# Patient Record
Sex: FEMALE | HISPANIC OR LATINO
[De-identification: names, ages, dates, MRNs, and addresses within clinical notes are randomized per-mention and may not be internally consistent; named-entity substitution may affect disease eponyms.]

---

## 2022-08-01 PROBLEM — Z00.00 ENCOUNTER FOR PREVENTIVE HEALTH EXAMINATION: Status: ACTIVE | Noted: 2022-08-01

## 2022-08-02 ENCOUNTER — APPOINTMENT (OUTPATIENT)
Dept: MATERNAL FETAL MEDICINE | Facility: CLINIC | Age: 37
End: 2022-08-02

## 2022-08-02 ENCOUNTER — ASOB RESULT (OUTPATIENT)
Age: 37
End: 2022-08-02

## 2022-08-02 VITALS — WEIGHT: 194 LBS | BODY MASS INDEX: 28.73 KG/M2 | HEIGHT: 69 IN

## 2022-08-02 DIAGNOSIS — Z82.49 FAMILY HISTORY OF ISCHEMIC HEART DISEASE AND OTHER DISEASES OF THE CIRCULATORY SYSTEM: ICD-10-CM

## 2022-08-02 DIAGNOSIS — Z78.9 OTHER SPECIFIED HEALTH STATUS: ICD-10-CM

## 2022-08-02 DIAGNOSIS — Z98.891 HISTORY OF UTERINE SCAR FROM PREVIOUS SURGERY: ICD-10-CM

## 2022-08-02 PROCEDURE — G0108 DIAB MANAGE TRN  PER INDIV: CPT | Mod: 95

## 2022-08-02 RX ORDER — BLOOD SUGAR DIAGNOSTIC
STRIP MISCELLANEOUS 4 TIMES DAILY
Qty: 1 | Refills: 2 | Status: ACTIVE | COMMUNITY
Start: 2022-08-02 | End: 1900-01-01

## 2022-08-02 RX ORDER — BLOOD-GLUCOSE METER
W/DEVICE EACH MISCELLANEOUS
Qty: 1 | Refills: 0 | Status: ACTIVE | COMMUNITY
Start: 2022-08-02 | End: 1900-01-01

## 2022-08-02 RX ORDER — LANCETS 33 GAUGE
EACH MISCELLANEOUS
Qty: 1 | Refills: 2 | Status: ACTIVE | COMMUNITY
Start: 2022-08-02 | End: 1900-01-01

## 2022-08-09 ENCOUNTER — ASOB RESULT (OUTPATIENT)
Age: 37
End: 2022-08-09

## 2022-08-09 ENCOUNTER — APPOINTMENT (OUTPATIENT)
Dept: MATERNAL FETAL MEDICINE | Facility: CLINIC | Age: 37
End: 2022-08-09

## 2022-08-09 VITALS — BODY MASS INDEX: 29.03 KG/M2 | WEIGHT: 196 LBS | HEIGHT: 69 IN

## 2022-08-09 PROCEDURE — G0108 DIAB MANAGE TRN  PER INDIV: CPT | Mod: 95

## 2022-08-16 ENCOUNTER — APPOINTMENT (OUTPATIENT)
Dept: ANTEPARTUM | Facility: CLINIC | Age: 37
End: 2022-08-16

## 2022-08-16 ENCOUNTER — ASOB RESULT (OUTPATIENT)
Age: 37
End: 2022-08-16

## 2022-08-16 ENCOUNTER — APPOINTMENT (OUTPATIENT)
Dept: MATERNAL FETAL MEDICINE | Facility: CLINIC | Age: 37
End: 2022-08-16

## 2022-08-16 VITALS
SYSTOLIC BLOOD PRESSURE: 96 MMHG | RESPIRATION RATE: 16 BRPM | WEIGHT: 202 LBS | BODY MASS INDEX: 29.92 KG/M2 | HEART RATE: 74 BPM | OXYGEN SATURATION: 100 % | DIASTOLIC BLOOD PRESSURE: 60 MMHG | HEIGHT: 69 IN

## 2022-08-16 DIAGNOSIS — O34.219 MATERNAL CARE FOR UNSPECIFIED TYPE SCAR FROM PREVIOUS CESAREAN DELIVERY: ICD-10-CM

## 2022-08-16 DIAGNOSIS — O24.419 GESTATIONAL DIABETES MELLITUS IN PREGNANCY, UNSPECIFIED CONTROL: ICD-10-CM

## 2022-08-16 DIAGNOSIS — O09.529 SUPERVISION OF ELDERLY MULTIGRAVIDA, UNSPECIFIED TRIMESTER: ICD-10-CM

## 2022-08-16 PROCEDURE — 76816 OB US FOLLOW-UP PER FETUS: CPT

## 2022-08-16 PROCEDURE — 99204 OFFICE O/P NEW MOD 45 MIN: CPT

## 2022-08-16 PROCEDURE — 76819 FETAL BIOPHYS PROFIL W/O NST: CPT

## 2022-08-16 RX ORDER — MULTI-VITAMIN/MINERAL SUPPLEMENT WITH SODIUM ASCORBATE, CHOLECALCIFEROL, DI-ALPHA-TOCOPHERYL ACETATE, THIAMINE MONONITRATE, RIBOFLAVIN, NIACINAMIDE, PYRIDOXINE HCL, FOLIC ACID, CYANOCOBALAMIN, CALCIUM FORMATE, CALCIUM CARBONATE, FERROUS (II) BIS-GLYCINATE CHELATE, POTASSIUM IODIDE, ZINC OXIDE, AND CHOLINE BITARTRATE 50; 155; 45; 32; 55; 30; 3; 1; 20; 10; 100; 10; 120; 3; 450 MG/1; MG/1; MG/1; MG/1; MG/1; [IU]/1; MG/1; MG/1; MG/1; UG/1; UG/1; MG/1; MG/1; MG/1; [IU]/1
32-1 TABLET, FILM COATED ORAL
Qty: 30 | Refills: 0 | Status: ACTIVE | COMMUNITY
Start: 2022-07-20

## 2022-08-16 RX ORDER — DOCUSATE SODIUM 100 MG/1
100 CAPSULE, LIQUID FILLED ORAL
Qty: 90 | Refills: 0 | Status: ACTIVE | COMMUNITY
Start: 2022-06-04

## 2022-08-16 RX ORDER — NITROFURANTOIN MACROCRYSTALS 100 MG/1
100 CAPSULE ORAL
Qty: 28 | Refills: 0 | Status: DISCONTINUED | COMMUNITY
Start: 2022-03-05

## 2022-08-16 RX ORDER — MONTELUKAST 10 MG/1
10 TABLET, FILM COATED ORAL
Qty: 90 | Refills: 0 | Status: ACTIVE | COMMUNITY
Start: 2022-08-10

## 2022-08-18 PROBLEM — O09.529 ADVANCED MATERNAL AGE IN MULTIGRAVIDA: Status: ACTIVE | Noted: 2022-08-02

## 2022-08-18 PROBLEM — O24.419 GESTATIONAL DIABETES: Status: ACTIVE | Noted: 2022-08-02

## 2022-08-18 PROBLEM — O34.219 PREVIOUS CESAREAN SECTION COMPLICATING PREGNANCY: Status: ACTIVE | Noted: 2022-08-16

## 2022-08-18 NOTE — DISCUSSION/SUMMARY
[FreeTextEntry1] : Frankelina was extensively counseled regarding the following issues:\par \par Gestational diabetes: The patient was counseled regarding diet, blood sugar testing, and managing diabetes during pregnancy. Tight glycemic control in pregnancy is necessary to decrease fetal and  risks including macrosomia, shoulder dystocia, medically or obstetrically-indicated  delivery,  section, polyhydramnios, and preeclampsia. It was discussed that women who are able to maintain good glycemic control with diet and/or medication generally have favorable obstetric outcomes. She understands that fasting glucose values should be <95 and 1-hour postprandial values should be <140. Her fingerstick log was reviewed. At this time, her fasting values and postprandial values are at goal. There is no indication for hypoglycemic medication. She was instructed to check fingersticks 4 times daily and to bring her log to all appointments. She is encouraged to have a two-hour glucose tolerance test at 6-8 weeks postpartum to evaluate for diabetes mellitus and insulin resistance. Delivery should not occur prior to 39 weeks unless otherwise indicated. Nutrition follow-up on 22.\par \par \par Thank you for requesting a consultation on this patient. The total time spent in preparation for this visit, medical history taking, orders, review of records, counseling the patient, and writing this note was 45 minutes.\par \par At the end of our discussion, the patient indicated that her questions were answered and she seemed satisfied with our discussion. Please do not hesitate to contact us with any questions.\par \par Sincerely,\par \par \par Jesus Alberto Dalton MD, [facility]DELIA\par Attending Physician, Maternal-Fetal Medicine\par

## 2022-08-18 NOTE — SURGICAL HISTORY
[Fibroids] : no fibroids [Abn Paps] : no abnormal pap smears [Breast Disease] : no breast disease [STI's] : no STI's [Infertility] : no infertility [Cysts] : cysts [OC Use] : no OC use [de-identified] : Hx Ovarian Cystectomy

## 2022-08-18 NOTE — FAMILY HISTORY
[Reported Family History Of Birth Defects] : no congenital heart defects [Brian-Sachs Carrier] : no Brian-Sachs [Family History] : no mental retardation/autism [Reported Family History Of Genetic Disease] : no history of child defect in child of baby father

## 2022-08-18 NOTE — OB HISTORY
[Pregnancy History] : patient received anesthesia [___] : no pregnancy complications reported [LMP: ___] : LMP: [unfilled] [STEVE: ___] : STEVE: [unfilled] [EGA: ___ wks] : EGA: [unfilled] wks [Spontaneous] : Spontaneous conception [FreeTextEntry1] : [facility] to discuss current pregnancy complicated by GDM/A1. Pt did not bring HGM or BS Log, encouraged pt to bring both to all appointments. \par Pt states her FBS Range: 79-89  PP/B:    PP/L:  100-125   PP/D: \par Pt is testing 4x/d and eating 3 meals and 2 snacks per day.\par 7/21/2022 A1C=5.2%\par Pt reports good FM. Pt reports occ ctx/cramping. Pt denies vag. bleeding/leaking.\par Pt is scheduled to see her OB 8/20/2022 and is doing NST's there.\par Pt is scheduled for a R C/S 9/27/2022 [Definite:  ___ (Date)] : the last menstrual period was [unfilled] [Normal Amount/Duration] : was of a normal amount and duration [Spotting Between  Menses] : no spotting between menses [Regular Cycle Intervals] : periods have been regular [Menstrual Cramps] : menstrual cramps

## 2022-08-18 NOTE — VITALS
[LMP (date): ___] : LMP was on [unfilled] [GA =___ Weeks] : which calculates to a GA of [unfilled] weeks [GA= ___ Days] : and [unfilled] day(s) [STEVE by LMP (date): ___] : The calculated STEVE by LMP is [unfilled] [By LMP] : this is the final STEVE

## 2022-08-30 ENCOUNTER — APPOINTMENT (OUTPATIENT)
Dept: MATERNAL FETAL MEDICINE | Facility: CLINIC | Age: 37
End: 2022-08-30

## 2022-09-13 ENCOUNTER — APPOINTMENT (OUTPATIENT)
Dept: ANTEPARTUM | Facility: CLINIC | Age: 37
End: 2022-09-13

## 2022-09-13 ENCOUNTER — APPOINTMENT (OUTPATIENT)
Dept: MATERNAL FETAL MEDICINE | Facility: CLINIC | Age: 37
End: 2022-09-13

## 2022-09-13 ENCOUNTER — ASOB RESULT (OUTPATIENT)
Age: 37
End: 2022-09-13

## 2022-09-13 PROCEDURE — 76816 OB US FOLLOW-UP PER FETUS: CPT

## 2022-09-13 PROCEDURE — 76819 FETAL BIOPHYS PROFIL W/O NST: CPT | Mod: 59
